# Patient Record
Sex: MALE | Race: WHITE
[De-identification: names, ages, dates, MRNs, and addresses within clinical notes are randomized per-mention and may not be internally consistent; named-entity substitution may affect disease eponyms.]

---

## 2020-01-18 ENCOUNTER — HOSPITAL ENCOUNTER (EMERGENCY)
Dept: HOSPITAL 75 - ER FS | Age: 20
Discharge: HOME | End: 2020-01-18
Payer: MEDICAID

## 2020-01-18 VITALS — WEIGHT: 169.76 LBS | BODY MASS INDEX: 25.14 KG/M2 | HEIGHT: 68.9 IN

## 2020-01-18 VITALS — SYSTOLIC BLOOD PRESSURE: 155 MMHG | DIASTOLIC BLOOD PRESSURE: 82 MMHG

## 2020-01-18 DIAGNOSIS — F31.9: ICD-10-CM

## 2020-01-18 DIAGNOSIS — G56.92: Primary | ICD-10-CM

## 2020-01-18 DIAGNOSIS — F41.9: ICD-10-CM

## 2020-01-18 PROCEDURE — 93005 ELECTROCARDIOGRAM TRACING: CPT

## 2020-01-18 NOTE — ED UPPER EXTREMITY
General


Chief Complaint:  Upper Extremity


Stated Complaint:  LEFT ARM PAIN


Source:  patient


Exam Limitations:  no limitations





History of Present Illness


Date Seen by Provider:  Jan 18, 2020


Time Seen by Provider:  02:00


Initial Comments


Patient presents to ER by private conveyance with chief complaint of some left 

arm pain pulsating since yesterday afternoon lasting for a few seconds to a 

minute and then going away. It came back again this morning. He was not sleepi

ng. He does not smoke or have a history of coronary disease or familial history 

of early-onset coronary disease. No chest pain, shortness of breath, nausea 

sweats or chills. No hypertension hyperlipidemia. He does have a history of 

anxiety and bipolar disorder and has not followed with a doctor for about a year

because he did not feel medications were helping. He used to follow with 

[facility]. He does have a history of neck and back pain aggravated 

yesterday by unloading a truck at [employer] where he works. He is not having any

pain in his shoulder or elbow. He is right-handed and this is contralateral 

side. He says the pain is just starting to come back now. He does not take any 

pain medicine for it. He does not take any medications routinely. He has no 

allergies. He says he was reading online and arm pain could be a sign of a heart

attack so he decided to come to a doctor tonight.





Allergies and Home Medications


Allergies


Coded Allergies:  


     No Known Drug Allergies (Unverified , 1/18/20)





Patient Home Medication List


Home Medication List Reviewed:  Yes





Review of Systems


Constitutional:  No chills, No diaphoresis


EENTM:  No hearing loss, No ear pain


Respiratory:  No cough, No short of breath


Cardiovascular:  No chest pain, No edema


Gastrointestinal:  No abdominal pain, No nausea


Genitourinary:  No discharge, No dysuria


Musculoskeletal:  see HPI





All Other Systems Reviewed


Negative Unless Noted:  Yes





Past Medical-Social-Family Hx


Patient Social History


Alcohol Use:  Denies Use


Recreational Drug Use:  No


Type Used:  Electronic/Vapor


Recent Foreign Travel:  No


Contact w/Someone Who Travel:  No





Physical Exam


Vital Signs


Capillary Refill :


Height, Weight, BMI


Height: '"


Weight: lbs. oz. kg;  BMI


Method:


General Appearance:  WD/WN, no apparent distress


HEENT:  PERRL/EOMI, pharynx normal


Neck:  non-tender, full range of motion, normal inspection


Cardiovascular:  normal peripheral pulses, regular rate, rhythm


Respiratory:  no respiratory distress, no accessory muscle use


Shoulder:  normal inspection, non-tender, no evidence of injury, normal ROM


Elbow/Forearm:  normal inspection, non-tender, no evidence of injury, normal 

ROM, Left


Wrist:  Yes normal inspection, Yes non-tender, Yes no evidence of injury, Yes 

normal ROM


Neurologic/Psychiatric:  alert, normal mood/affect, oriented x 3, other (left 

brachial radialis reflex 2 out of 4. )


Skin:  normal color, warm/dry





Progress/Results/Core Measures


Results/Orders


My Orders





Orders - SWATI DAVID


Ekg Tracing (1/18/20 02:08)


Naproxen Tablet (Naprosyn Tablet) (1/18/20 02:15)








Progress


Progress Note :  


   Time:  02:14


Progress Note


Pulsating pain in his left forearm most likely from a compressive neuropathy 

somewhere in either his left upper extremity or neck. He has some neck and back 

pain. We have recommended NSAIDs, stretching exercises, chiropractic and massage

therapy. If he's not seeing improvement then we recommend he follow up with a 

primary care doctor. At this time he has no true anginal symptoms nor coronary 

history but because of his concern with what he read on the Internet we obtained

an EKG which was also normal. Ibuprofen 800 mg by mouth


Initial ECG Impression Date:  Jan 18, 2020


Initial ECG Impression Time:  02:09


Initial ECG Rate:  95


Initial ECG Rhythm:  Normal Sinus


Initial ECG Intervals:  Normal


Initial ECG Impression:  Normal


Initial ECG Comparisson:  No Previous ECG Available


Comment


Normal sinus rhythm without clinically relevant ST elevation or depression.





Departure


Impression





   Primary Impression:  


   Compression neuropathy of left upper extremity


Disposition:  01 HOME, SELF-CARE


Condition:  Stable





Departure-Patient Inst.


Decision time for Depature:  02:16


Referrals:  


[facility] (PCP/Family)


Primary Care Physician


Patient Instructions:  Active Range of Motion Exercises, Neck and Shoulders, 

Radial Nerve Entrapment





Add. Discharge Instructions:  


Naproxen 2 tablets over-the-counter twice a day on a scheduled basis for the 

next 1-2 weeks.


You may also use the prescription naproxen 1 tablet 500 mg twice a day.


Tylenol 1000 mg every 8 hours as needed for breakthrough pain.


If you're having neck pain or back pain you can also use topical creams such as 

icy hot or Biofreeze.


Ice applied your neck or back for the first couple days can be helpful.


Follow-up with a chiropractor for reevaluation.


Follow-up with primary care for further evaluation if your symptoms persist for 

more than 2 weeks despite NSAIDs such as naproxen/Aleve or ibuprofen.





All discharge instructions reviewed with patient and/or family. Voiced 

understanding.


Scripts


Naproxen (Naprosyn) 500 Mg Tablet


500 MG PO BID, #30 TAB 0 Refills


   Prov: SWATI DAVID         1/18/20











SWATI DAVID                 Jan 18, 2020 02:16